# Patient Record
Sex: FEMALE | Race: WHITE | NOT HISPANIC OR LATINO | Employment: UNEMPLOYED | URBAN - NONMETROPOLITAN AREA
[De-identification: names, ages, dates, MRNs, and addresses within clinical notes are randomized per-mention and may not be internally consistent; named-entity substitution may affect disease eponyms.]

---

## 2021-02-12 ENCOUNTER — HOSPITAL ENCOUNTER (INPATIENT)
Facility: HOSPITAL | Age: 46
LOS: 7 days | Discharge: HOME | End: 2021-02-19
Attending: PSYCHIATRY & NEUROLOGY | Admitting: PSYCHIATRY & NEUROLOGY

## 2021-02-12 DIAGNOSIS — B18.2 CHRONIC HEPATITIS C WITHOUT HEPATIC COMA (HCC): Primary | ICD-10-CM

## 2021-02-12 PROBLEM — R45.851 SUICIDAL IDEATION: Status: ACTIVE | Noted: 2021-02-12

## 2021-02-12 RX ORDER — CLONIDINE HYDROCHLORIDE 0.1 MG/1
0.1 TABLET ORAL EVERY 4 HOURS PRN
Status: DISCONTINUED | OUTPATIENT
Start: 2021-02-12 | End: 2021-02-13 | Stop reason: SDUPTHER

## 2021-02-12 RX ORDER — ACETAMINOPHEN 325 MG/1
650 TABLET ORAL EVERY 4 HOURS PRN
Status: DISCONTINUED | OUTPATIENT
Start: 2021-02-12 | End: 2021-02-19 | Stop reason: HOSPADM

## 2021-02-12 RX ORDER — ALUMINA, MAGNESIA, AND SIMETHICONE 2400; 2400; 240 MG/30ML; MG/30ML; MG/30ML
15 SUSPENSION ORAL EVERY 6 HOURS PRN
Status: DISCONTINUED | OUTPATIENT
Start: 2021-02-12 | End: 2021-02-19 | Stop reason: HOSPADM

## 2021-02-12 RX ORDER — VENLAFAXINE HYDROCHLORIDE 150 MG/1
150 CAPSULE, EXTENDED RELEASE ORAL DAILY
Status: ON HOLD | COMMUNITY
Start: 2021-01-04 | End: 2021-02-19 | Stop reason: SDUPTHER

## 2021-02-12 RX ORDER — LOPERAMIDE HYDROCHLORIDE 2 MG/1
2 CAPSULE ORAL
Status: DISCONTINUED | OUTPATIENT
Start: 2021-02-12 | End: 2021-02-19 | Stop reason: HOSPADM

## 2021-02-12 RX ORDER — NICOTINE 21 MG/24HR
1 PATCH, TRANSDERMAL 24 HOURS TRANSDERMAL EVERY 24 HOURS
Status: DISCONTINUED | OUTPATIENT
Start: 2021-02-13 | End: 2021-02-19 | Stop reason: HOSPADM

## 2021-02-12 RX ORDER — OLANZAPINE 10 MG/1
10 TABLET ORAL 2 TIMES DAILY
Status: ON HOLD | COMMUNITY
End: 2021-02-19 | Stop reason: SDUPTHER

## 2021-02-12 RX ORDER — HYDROXYZINE PAMOATE 50 MG/1
50 CAPSULE ORAL EVERY 6 HOURS PRN
Status: DISCONTINUED | OUTPATIENT
Start: 2021-02-12 | End: 2021-02-19 | Stop reason: HOSPADM

## 2021-02-12 RX ORDER — ONDANSETRON 4 MG/1
4 TABLET, FILM COATED ORAL EVERY 6 HOURS PRN
Status: DISCONTINUED | OUTPATIENT
Start: 2021-02-12 | End: 2021-02-19 | Stop reason: HOSPADM

## 2021-02-12 RX ORDER — TRAZODONE HYDROCHLORIDE 50 MG/1
50 TABLET ORAL NIGHTLY PRN
Status: DISCONTINUED | OUTPATIENT
Start: 2021-02-12 | End: 2021-02-19 | Stop reason: HOSPADM

## 2021-02-12 RX ADMIN — TRAZODONE HYDROCHLORIDE 50 MG: 50 TABLET ORAL at 21:41

## 2021-02-12 RX ADMIN — HYDROXYZINE PAMOATE 50 MG: 50 CAPSULE ORAL at 21:41

## 2021-02-12 RX ADMIN — ACETAMINOPHEN 650 MG: 325 TABLET, FILM COATED ORAL at 21:41

## 2021-02-13 PROBLEM — F11.90 OPIOID USE DISORDER: Status: ACTIVE | Noted: 2021-02-13

## 2021-02-13 PROBLEM — F19.94 SUBSTANCE INDUCED MOOD DISORDER (HCC): Status: ACTIVE | Noted: 2021-02-13

## 2021-02-13 PROBLEM — F31.60 BIPOLAR AFFECTIVE DISORDER, MIXED (HCC): Status: ACTIVE | Noted: 2021-02-13

## 2021-02-13 PROBLEM — F15.10 METHAMPHETAMINE USE (HCC): Status: ACTIVE | Noted: 2021-02-13

## 2021-02-13 PROBLEM — F15.90 STIMULANT USE DISORDER: Status: ACTIVE | Noted: 2021-02-13

## 2021-02-13 PROBLEM — F13.20 MODERATE BENZODIAZEPINE USE DISORDER (HCC): Status: ACTIVE | Noted: 2021-02-13

## 2021-02-13 PROBLEM — F19.959 SUBSTANCE-INDUCED PSYCHOTIC DISORDER (HCC): Status: ACTIVE | Noted: 2021-02-13

## 2021-02-13 LAB
CHOLEST SERPL-MCNC: 223 MG/DL (ref 0–200)
GLUCOSE P FAST SERPL-MCNC: 104 MG/DL (ref 74–106)
HDLC SERPL-MCNC: 77 MG/DL (ref 40–60)
LDLC SERPL CALC-MCNC: 134 MG/DL (ref 0–100)
LDLC/HDLC SERPL: 1.71 {RATIO}
TRIGL SERPL-MCNC: 70 MG/DL (ref 0–150)
VLDLC SERPL-MCNC: 12 MG/DL (ref 5–40)

## 2021-02-13 PROCEDURE — 80061 LIPID PANEL: CPT | Performed by: PSYCHIATRY & NEUROLOGY

## 2021-02-13 PROCEDURE — 82947 ASSAY GLUCOSE BLOOD QUANT: CPT | Performed by: PSYCHIATRY & NEUROLOGY

## 2021-02-13 PROCEDURE — 99223 1ST HOSP IP/OBS HIGH 75: CPT | Performed by: PSYCHIATRY & NEUROLOGY

## 2021-02-13 RX ORDER — LORAZEPAM 2 MG/1
2 TABLET ORAL 2 TIMES DAILY
Status: COMPLETED | OUTPATIENT
Start: 2021-02-16 | End: 2021-02-16

## 2021-02-13 RX ORDER — OLANZAPINE 5 MG/1
10 TABLET, ORALLY DISINTEGRATING ORAL EVERY 8 HOURS PRN
Status: DISCONTINUED | OUTPATIENT
Start: 2021-02-13 | End: 2021-02-19 | Stop reason: HOSPADM

## 2021-02-13 RX ORDER — DICYCLOMINE HYDROCHLORIDE 10 MG/1
10 CAPSULE ORAL 3 TIMES DAILY PRN
Status: DISCONTINUED | OUTPATIENT
Start: 2021-02-13 | End: 2021-02-18

## 2021-02-13 RX ORDER — CYCLOBENZAPRINE HCL 10 MG
10 TABLET ORAL 3 TIMES DAILY PRN
Status: DISCONTINUED | OUTPATIENT
Start: 2021-02-13 | End: 2021-02-18

## 2021-02-13 RX ORDER — CLONIDINE HYDROCHLORIDE 0.1 MG/1
0.1 TABLET ORAL 2 TIMES DAILY PRN
Status: ACTIVE | OUTPATIENT
Start: 2021-02-16 | End: 2021-02-17

## 2021-02-13 RX ORDER — OLANZAPINE 5 MG/1
5 TABLET ORAL 2 TIMES DAILY
Status: DISCONTINUED | OUTPATIENT
Start: 2021-02-13 | End: 2021-02-14

## 2021-02-13 RX ORDER — LORAZEPAM 2 MG/1
2 TABLET ORAL 4 TIMES DAILY
Status: COMPLETED | OUTPATIENT
Start: 2021-02-13 | End: 2021-02-14

## 2021-02-13 RX ORDER — LORAZEPAM 2 MG/1
2 TABLET ORAL 3 TIMES DAILY
Status: COMPLETED | OUTPATIENT
Start: 2021-02-15 | End: 2021-02-15

## 2021-02-13 RX ORDER — VENLAFAXINE HYDROCHLORIDE 37.5 MG/1
37.5 CAPSULE, EXTENDED RELEASE ORAL
Status: COMPLETED | OUTPATIENT
Start: 2021-02-13 | End: 2021-02-14

## 2021-02-13 RX ORDER — NALTREXONE HYDROCHLORIDE 50 MG/1
25 TABLET, FILM COATED ORAL DAILY
Status: DISCONTINUED | OUTPATIENT
Start: 2021-02-13 | End: 2021-02-13

## 2021-02-13 RX ORDER — CLONIDINE HYDROCHLORIDE 0.1 MG/1
0.1 TABLET ORAL 4 TIMES DAILY PRN
Status: ACTIVE | OUTPATIENT
Start: 2021-02-13 | End: 2021-02-14

## 2021-02-13 RX ORDER — VENLAFAXINE HYDROCHLORIDE 75 MG/1
75 CAPSULE, EXTENDED RELEASE ORAL
Status: DISCONTINUED | OUTPATIENT
Start: 2021-02-15 | End: 2021-02-15

## 2021-02-13 RX ORDER — LORAZEPAM 2 MG/1
2 TABLET ORAL DAILY
Status: COMPLETED | OUTPATIENT
Start: 2021-02-17 | End: 2021-02-17

## 2021-02-13 RX ORDER — OLANZAPINE 5 MG/1
10 TABLET, ORALLY DISINTEGRATING ORAL ONCE
Status: COMPLETED | OUTPATIENT
Start: 2021-02-13 | End: 2021-02-13

## 2021-02-13 RX ORDER — PROPRANOLOL HYDROCHLORIDE 40 MG/1
40 TABLET ORAL ONCE
Status: DISCONTINUED | OUTPATIENT
Start: 2021-02-13 | End: 2021-02-13

## 2021-02-13 RX ORDER — CLONIDINE HYDROCHLORIDE 0.1 MG/1
0.1 TABLET ORAL 3 TIMES DAILY PRN
Status: ACTIVE | OUTPATIENT
Start: 2021-02-15 | End: 2021-02-16

## 2021-02-13 RX ORDER — CLONIDINE HYDROCHLORIDE 0.1 MG/1
0.1 TABLET ORAL ONCE AS NEEDED
Status: ACTIVE | OUTPATIENT
Start: 2021-02-17 | End: 2021-02-18

## 2021-02-13 RX ORDER — CLONIDINE HYDROCHLORIDE 0.1 MG/1
0.1 TABLET ORAL 4 TIMES DAILY PRN
Status: ACTIVE | OUTPATIENT
Start: 2021-02-14 | End: 2021-02-15

## 2021-02-13 RX ADMIN — Medication 1 PATCH: at 08:34

## 2021-02-13 RX ADMIN — OLANZAPINE 5 MG: 5 TABLET, FILM COATED ORAL at 20:26

## 2021-02-13 RX ADMIN — OLANZAPINE 10 MG: 5 TABLET, ORALLY DISINTEGRATING ORAL at 15:23

## 2021-02-13 RX ADMIN — VENLAFAXINE HYDROCHLORIDE 37.5 MG: 37.5 CAPSULE, EXTENDED RELEASE ORAL at 15:22

## 2021-02-13 RX ADMIN — LORAZEPAM 2 MG: 2 TABLET ORAL at 17:12

## 2021-02-13 RX ADMIN — LORAZEPAM 2 MG: 2 TABLET ORAL at 20:26
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
## 2021-02-13 NOTE — PLAN OF CARE
Goal Outcome Evaluation:        Outcome Summary: new admit. Patient took tylenol for generalized pain, vistaril for anxiety, and trazodone for sleep. Patient is resting now currently.

## 2021-02-13 NOTE — CONSULTS
"      [facility] Medicine Admission      Date of Admission: 2/12/2021      Primary Care Physician: Provider, No Known      Chief Complaint: No complaints    HPI: This is a 45-year-old female with past medical history of bipolar disorder that presented to [facility] on 2/12/2021 as a direct admit from [facility] secondary to suicidal ideation.  Patient reports no fever, chills, shortness of air, cough or headache.  The patient does not take any daily medications for medical issues.    Concurrent Medical History:  has no past medical history on file.    Past Surgical History:  has no past surgical history on file.    Family History: Hypertension.     Social History:  reports that she has been smoking cigarettes. She has been smoking about 2.00 packs per day. She has never used smokeless tobacco.    Allergies:   Allergies   Allergen Reactions   • Amoxicillin Nausea And Vomiting     vomiting   • Lamotrigine Other (See Comments)     Feels like \"there's something in my vagina\"     • Morphine Other (See Comments)     Pt reports \"it gave me an enlarged liver.\"       Medications:   Prior to Admission medications    Medication Sig Start Date End Date Taking? Authorizing Provider   OLANZapine (zyPREXA) 10 MG tablet Take 10 mg by mouth 2 (Two) Times a Day.   Yes Gerardo Salazar MD   venlafaxine XR (EFFEXOR-XR) 150 MG 24 hr capsule Take 150 mg by mouth Daily. 1/4/21 1/5/22 Yes Gerardo Salazar MD       Review of Systems:  Review of Systems   Constitutional: Negative for activity change and fatigue.   HENT: Negative for ear pain and sore throat.    Eyes: Negative for pain and discharge.   Respiratory: Negative for cough and shortness of breath.    Cardiovascular: Negative for chest pain and palpitations.   Gastrointestinal: Negative for abdominal pain and nausea.   Endocrine: Negative for cold intolerance and heat intolerance.   Genitourinary: Negative for difficulty " urinating and dysuria.   Musculoskeletal: Negative for arthralgias and gait problem.   Skin: Negative for color change and rash.   Neurological: Negative for dizziness and weakness.   Psychiatric/Behavioral: Negative for agitation and confusion.      Otherwise complete ROS is negative except as mentioned above.    Physical Exam:   Temp:  [96.9 °F (36.1 °C)-97.9 °F (36.6 °C)] 97.8 °F (36.6 °C)  Heart Rate:  [75-84] 84  Resp:  [18] 18  BP: (100-133)/(58-78) 100/58  Physical Exam  Constitutional:       Appearance: She is well-developed.   HENT:      Head: Normocephalic and atraumatic.   Eyes:      Pupils: Pupils are equal, round, and reactive to light.   Neck:      Musculoskeletal: Normal range of motion and neck supple.   Cardiovascular:      Rate and Rhythm: Normal rate and regular rhythm.   Pulmonary:      Effort: Pulmonary effort is normal.      Breath sounds: Normal breath sounds.   Abdominal:      General: Bowel sounds are normal.      Palpations: Abdomen is soft.   Musculoskeletal: Normal range of motion.   Skin:     General: Skin is warm and dry.   Neurological:      Mental Status: She is alert and oriented to person, place, and time.   Psychiatric:         Behavior: Behavior normal.       CN I: Sense of smell intact  CN II: Visual fields intact  CN III,IV,VI: extraocular movements intact  CN V: Masseter strength and sensation in all three divisions intact  CN VII: Smile and eyelid closure symmetrical  CN VIII: Hearing intact  CN IX and X: Voice and palate movement intact  CN XI: Shoulder shrug intact  CN XII: Tongue protrusion and movement intact      Results Reviewed:  I have personally reviewed current lab, radiology, and data and agree with results.  Lab Results (last 24 hours)     Procedure Component Value Units Date/Time    Glucose, Fasting [[id number]]  (Normal) Collected: 02/13/21 0604    Specimen: Blood Updated: 02/13/21 0735     Glucose, Fasting 104 mg/dL     Lipid Panel [[id number]]  (Abnormal)  Collected: 02/13/21 0604    Specimen: Blood Updated: 02/13/21 0735     Total Cholesterol 223 mg/dL      Triglycerides 70 mg/dL      HDL Cholesterol 77 mg/dL      LDL Cholesterol  134 mg/dL      VLDL Cholesterol 12 mg/dL      LDL/HDL Ratio 1.71    Narrative:      Cholesterol Reference Ranges  (U.S. Department of Health and Human Services ATP III Classifications)    Desirable          <200 mg/dL  Borderline High    200-239 mg/dL  High Risk          >240 mg/dL      Triglyceride Reference Ranges  (U.S. Department of Health and Human Services ATP III Classifications)    Normal           <150 mg/dL  Borderline High  150-199 mg/dL  High             200-499 mg/dL  Very High        >500 mg/dL    HDL Reference Ranges  (U.S. Department of Health and Human Services ATP III Classifcations)    Low     <40 mg/dl (major risk factor for CHD)  High    >60 mg/dl ('negative' risk factor for CHD)        LDL Reference Ranges  (U.S. Department of Health and Human Services ATP III Classifcations)    Optimal          <100 mg/dL  Near Optimal     100-129 mg/dL  Borderline High  130-159 mg/dL  High             160-189 mg/dL  Very High        >189 mg/dL        Imaging Results (Last 24 Hours)     ** No results found for the last 24 hours. **            Assessment:        Active Hospital Problems    Diagnosis POA   • **Suicidal ideation [R45.851] Not Applicable   • Bipolar affective disorder, mixed (CMS/HCC) [F31.60] Unknown   • Opioid use disorder (CMS/HCC) [F11.99] Unknown   • Stimulant use disorder [F15.90] Unknown   • Methamphetamine use (CMS/HCC) [F15.10] Unknown   • Moderate benzodiazepine use disorder (CMS/HCC) [F13.20] Unknown   • Rule In/Out Substance induced mood disorder [F19.94] Unknown   • Rule In / Out Substance-induced psychotic disorder [F19.959] Unknown       Plan:  Suicidal ideation/bipolar affective disorder: Continue therapy with psychiatry.    We will sign off here.  Please contact the hospitalist services for any further  issues.    I discussed the patient's findings and my recommendations with: The patient      This document has been electronically signed by ALETHA Tillman on February 13, 2021 16:53 CST
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
## 2021-02-14 LAB
25(OH)D3 SERPL-MCNC: 26.2 NG/ML (ref 30–100)
FOLATE SERPL-MCNC: 6.85 NG/ML (ref 4.78–24.2)
HAV IGM SERPL QL IA: ABNORMAL
HBV CORE IGM SERPL QL IA: ABNORMAL
HBV SURFACE AG SERPL QL IA: ABNORMAL
HCV AB SER DONR QL: REACTIVE
HIV1+2 AB SER QL: NORMAL
RPR SER QL: NORMAL
TSH SERPL DL<=0.05 MIU/L-ACNC: 1.45 UIU/ML (ref 0.27–4.2)
VIT B12 BLD-MCNC: 578 PG/ML (ref 211–946)
WHOLE BLOOD HOLD SPECIMEN: NORMAL

## 2021-02-14 PROCEDURE — G0432 EIA HIV-1/HIV-2 SCREEN: HCPCS | Performed by: PSYCHIATRY & NEUROLOGY

## 2021-02-14 PROCEDURE — 99232 SBSQ HOSP IP/OBS MODERATE 35: CPT | Performed by: PSYCHIATRY & NEUROLOGY

## 2021-02-14 PROCEDURE — 82746 ASSAY OF FOLIC ACID SERUM: CPT | Performed by: PSYCHIATRY & NEUROLOGY

## 2021-02-14 PROCEDURE — 82306 VITAMIN D 25 HYDROXY: CPT | Performed by: PSYCHIATRY & NEUROLOGY

## 2021-02-14 PROCEDURE — 80074 ACUTE HEPATITIS PANEL: CPT | Performed by: PSYCHIATRY & NEUROLOGY

## 2021-02-14 PROCEDURE — 86592 SYPHILIS TEST NON-TREP QUAL: CPT | Performed by: PSYCHIATRY & NEUROLOGY

## 2021-02-14 PROCEDURE — 82607 VITAMIN B-12: CPT | Performed by: PSYCHIATRY & NEUROLOGY

## 2021-02-14 PROCEDURE — 84443 ASSAY THYROID STIM HORMONE: CPT | Performed by: PSYCHIATRY & NEUROLOGY

## 2021-02-14 RX ADMIN — VENLAFAXINE HYDROCHLORIDE 37.5 MG: 37.5 CAPSULE, EXTENDED RELEASE ORAL at 08:34

## 2021-02-14 RX ADMIN — LORAZEPAM 2 MG: 2 TABLET ORAL at 12:53

## 2021-02-14 RX ADMIN — Medication 1 PATCH: at 08:36

## 2021-02-14 RX ADMIN — OLANZAPINE 5 MG: 5 TABLET, FILM COATED ORAL at 08:34

## 2021-02-14 RX ADMIN — LORAZEPAM 2 MG: 2 TABLET ORAL at 08:34

## 2021-02-14 RX ADMIN — LORAZEPAM 2 MG: 2 TABLET ORAL at 17:10

## 2021-02-14 RX ADMIN — LORAZEPAM 2 MG: 2 TABLET ORAL at 20:25

## 2021-02-14 RX ADMIN — OLANZAPINE 7.5 MG: 2.5 TABLET, FILM COATED ORAL at 20:25

## 2021-02-14 NOTE — NURSING NOTE
Behavior   Note any precipitants to event or behavior   Describe level and action of any aggressive behavior or speech and associated interventions.     Anxiety: Excess Worry  Depression: depressed mood  Pain  0  AVH   no  S/I   no  Plan  no  H/I   no  Plan  no    Affect   flat      Note: Patient in room sleeping on assessment. She is alert and oriented. Her affect is flat and she participates minimally in assessment. She is cooperative and takes medications as ordered. Encouraged open communication with staff. Patient is agreeable to make needs known. No acute S/S of distress.       Intervention    PRN medication utilized:  no    Instructed in medication usage and effects  Medications administered as ordered  Encouraged to verbalize needs      Response    Verbalized understanding   Did patient take medications as ordered yes   Did patient interact with assessment?  Minimally     Plan    Will monitor for safety  Will monitor every 15 minutes as ordered  Will evaluate and promote the plan of care    Last BM:  unknown date  (Please chart in I/O as well)

## 2021-02-14 NOTE — PROGRESS NOTES
Psychiatry Progress Note   2/14/2021      HD: #2  Legal: Vol    Chief Complaint: Suicidal Ideation      Subjective --  Ms. Kimberly Henderson is a 45 y.o. female who was seen on the Adult unit.      Patient is seen in her room today.  She is a bit more organized.  Still suboptimally engaged.  She is very focused on medication.  Also we discussed what her plan would be after her stay here and she states she does not want to do further rehab.  I attempted to explore this rationale with the patient though she became more guarded and irritable.  She states that she will likely return home.    She continues to report suicidal ideation though does not as severe as yesterday.  She reports at times feeling restless though she is sitting appropriately and has not hyperactive or restless as before.  She is later seen out in the day room interacting more with peers and staff.        Review of Systems:  --Neuro: Denies headache  --GI: Denies stomachache  --MS: Denies muscle ache  --General: Denies dizziness.      Objective   Objective --    Vital Signs:  Temp:  [97.7 °F (36.5 °C)-98.7 °F (37.1 °C)] 97.7 °F (36.5 °C)  Heart Rate:  [84-89] 84  Resp:  [17] 17  BP: (119-131)/(70-80) 131/80    Patient Vitals for the past 24 hrs:   BP Temp Temp src Pulse Resp SpO2   02/14/21 0700 131/80 97.7 °F (36.5 °C) Tympanic 84 17 93 %   02/13/21 1900 119/70 98.7 °F (37.1 °C) Tympanic 89 17 95 %          Physical Exam:   -General Appearance:  alert and in NAD  -Hygiene:  Adequate   -Gait & Station:  Blank multiple: Normal  -Musculoskeletal:  No tremors or abnormal involuntary movements and No Cog [place] or Rigidity and No atrophy noted  -Pulm: unlaboured    Mental Status Exam:   --Cooperation:  more cooperative  --Eye Contact:  Fair  --Psychomotor Behavior:  Improving and More Appropriate today  --Mood:  Sad/Depressed  --Affect:  blunted and heavily constricted  --Speech:  improving today, not rambling  --Thought Process:  [place], Improving,  More Coherent, More Logical and More Organized  --Associations: Goal Directed, Circumstantial and Tangential  --Themes:  Worthlessness, Helplessness, Hopelessness and Failure  --Thought Content:     --Mood congruent   --Suicidal:  Suicidal Ideation    --Homicidal:  Denies   --Hallucinations:  Denies and Not appearing to respond to internal stimuli at time of my interview   --Delusion:  Paranoid - improving  --Cognitive Functioning:  --Consciousness: awake and alert  Attention:  Distractible  --Reliability:  adequate  --Insight:  Limited  --Judgment:  Impaired  --Impulse Control:  Impaired      Lab Results (last 24 hours)     Procedure Component Value Units Date/Time    Hepatitis Panel, Acute [[id number]]  (Abnormal) Collected: 02/14/21 0635    Specimen: Blood Updated: 02/14/21 0814     Hepatitis B Surface Ag Non-Reactive     Hep A IgM Non-Reactive     Hep B C IgM Non-Reactive     Hepatitis C Ab Reactive    Narrative:      Results may be falsely decreased if patient taking Biotin.     HIV-1 & HIV-2 Antibodies [[id number]]  (Normal) Collected: 02/14/21 0635    Specimen: Blood Updated: 02/14/21 0748    Narrative:      The following orders were created for panel order HIV-1 & HIV-2 Antibodies.  Procedure                               Abnormality         Status                     ---------                               -----------         ------                     HIV-1 / O / 2 Ag / Antib...[[id number]]  Normal              Final result                 Please view results for these tests on the individual orders.    HIV-1 / O / 2 Ag / Antibody 4th Generation [[id number]]  (Normal) Collected: 02/14/21 0635    Specimen: Blood Updated: 02/14/21 0748     HIV-1/ HIV-2 Non-Reactive    Narrative:      The HIV antibody/antigen combo assay is a qualitative assay for HIV that includes the p24 antigen as well as antibodies to HIV types 1 and 2. This test is intended to be used as a screening assay in the diagnosis of HIV infection in  patients over the age of 2.  Results may be falsely decreased if patient taking Biotin.      Extra Tubes [[id number]] Collected: 21    Specimen: Blood, Venous Line Updated: 21    Narrative:      The following orders were created for panel order Extra Tubes.  Procedure                               Abnormality         Status                     ---------                               -----------         ------                     Lavender Top[[id number]]                                     Final result                 Please view results for these tests on the individual orders.    Lavender Top [[id number]] Collected: 21    Specimen: Blood Updated: 21     Extra Tube hold for add-on     Comment: Auto resulted       TSH [[id number]]  (Normal) Collected: 21    Specimen: Blood Updated: 21     TSH 1.450 uIU/mL     Vitamin B12 [[id number]] Collected: 21    Specimen: Blood Updated: 21    Folate [[id number]] Collected: 21    Specimen: Blood Updated: 21    Vitamin D 25 Hydroxy [[id number]] Collected: 21    Specimen: Blood Updated: 21    RPR [[id number]] Collected: 21    Specimen: Blood Updated: 21        Results source: EMR    Imaging Results (Last 24 Hours)     ** No results found for the last 24 hours. **        Results source: EMR    Medications:   Scheduled Meds:LORazepam, 2 mg, Oral, 4x Daily    Followed by  [START ON 2/15/2021] LORazepam, 2 mg, Oral, TID    Followed by  [START ON 2021] LORazepam, 2 mg, Oral, BID    Followed by  [START ON 2021] LORazepam, 2 mg, Oral, Daily  nicotine, 1 patch, Transdermal, Q24H  OLANZapine, 5 mg, Oral, BID  [START ON 2/15/2021] venlafaxine XR, 75 mg, Oral, Daily With Breakfast      Continuous Infusions:   PRN Meds:.•  acetaminophen  •  aluminum-magnesium hydroxide-simethicone  •  [] cloNIDine **FOLLOWED BY** cloNIDine **FOLLOWED  BY** [START ON 2/15/2021] cloNIDine **FOLLOWED BY** [START ON 2/16/2021] cloNIDine **FOLLOWED BY** [START ON 2/17/2021] cloNIDine  •  cyclobenzaprine  •  dicyclomine  •  hydrOXYzine pamoate  •  loperamide  •  magnesium hydroxide  •  OLANZapine zydis  •  ondansetron  •  traZODone      Assessment:     Bipolar affective disorder, mixed (CMS/HCC)    Suicidal ideation    Opioid use disorder (CMS/HCC)    Stimulant use disorder    Methamphetamine use (CMS/HCC)    Moderate benzodiazepine use disorder (CMS/HCC)    Rule In/Out Substance induced mood disorder    Rule In / Out Substance-induced psychotic disorder        --> IMPRESSION: Slowly improving cognitive organization, lessening psychosis and paranoia.      Treatment Plan:  1) Will continue care for the patient on the behavioral health unit at [facility].    2) Will continue to provide treatment with the unit milieu, activities, therapies and psychopharmacological management.    3) Patient to be placed on or continued on  Q15 minute checks  and Suicide precautions.    4) Treatment Planning:    Bipolar disorder: slowly improving  --Titrate Zyprexa to 7.5mg BID  --Cont Zydis PRN  --Titrate Effexor to 75mg daily    Substance use d/o  --Cont Ativan taper over next four days  --Cont nicotine patch  --Cont COWS with Catpres use  --Cont to encourage sub use rehab    --> Psychotherapy provided: <15 min    --> Dispostion Planning: to community vs rehab in next 5 - 8 days.     Treatment plan and medication risks and benefits discussed with: Patient and Treatment Team.    Shivam Cummings II, MD  02/14/21 @ 11:32 CST    Dictated using Dragon.

## 2021-02-14 NOTE — PLAN OF CARE
Goal Outcome Evaluation:  Plan of Care Reviewed With: patient  Progress: no change  Outcome Summary: Patient has slept 7.5 hours thus far during shift and is currently still sleeping. Patient denies SI, HI and AVH.

## 2021-02-14 NOTE — NURSING NOTE
Behavior   Note any precipitants to event or behavior   Describe level and action of any aggressive behavior or speech and associated interventions.     Anxiety: Excess Worry and Restless/Edgy  Depression: depressed mood  Pain  0  AVH   no  S/I   no  Plan  no  H/I   no  Plan  no    Affect   flat      Note: Patient sleeping when signee entered room. COWS total of 1 at this time. Will continue to monitor and provide a safe environment.      Intervention    PRN medication utilized:  no    Instructed in medication usage and effects  Medications administered as ordered  Encouraged to verbalize needs      Response    Verbalized understanding   Did patient take medications as ordered yes   Did patient interact with assessment?  yes     Plan    Will monitor for safety  Will monitor every 15 minutes as ordered  Will evaluate and promote the plan of care    Last BM:    (Please chart in I/O as well)

## 2021-02-15 PROCEDURE — 99232 SBSQ HOSP IP/OBS MODERATE 35: CPT | Performed by: PSYCHIATRY & NEUROLOGY

## 2021-02-15 RX ORDER — OLANZAPINE 10 MG/1
10 TABLET ORAL 2 TIMES DAILY
Status: DISCONTINUED | OUTPATIENT
Start: 2021-02-15 | End: 2021-02-19 | Stop reason: HOSPADM

## 2021-02-15 RX ADMIN — OLANZAPINE 10 MG: 10 TABLET, FILM COATED ORAL at 20:26

## 2021-02-15 RX ADMIN — VENLAFAXINE HYDROCHLORIDE 75 MG: 75 CAPSULE, EXTENDED RELEASE ORAL at 08:06

## 2021-02-15 RX ADMIN — LORAZEPAM 2 MG: 2 TABLET ORAL at 08:07

## 2021-02-15 RX ADMIN — LORAZEPAM 2 MG: 2 TABLET ORAL at 15:24

## 2021-02-15 RX ADMIN — TRAZODONE HYDROCHLORIDE 50 MG: 50 TABLET ORAL at 01:19

## 2021-02-15 RX ADMIN — OLANZAPINE 7.5 MG: 2.5 TABLET, FILM COATED ORAL at 08:07

## 2021-02-15 RX ADMIN — Medication 1 PATCH: at 08:07

## 2021-02-15 RX ADMIN — LORAZEPAM 2 MG: 2 TABLET ORAL at 20:26

## 2021-02-15 NOTE — NURSING NOTE
Behavior   Note any precipitants to event or behavior   Describe level and action of any aggressive behavior or speech and associated interventions.     Anxiety: Patient denies at this time  Depression: Patient denies at this time  Pain  0  AVH   no  S/I   no  Plan  no  H/I   no  Plan  no    Affect   flat      Note:Pt is alert, oriented x3 verbal and ambulatory. Appropriate interaction with staff and peers, Took medication as instructed and ordered by the doctor. Cooperative and compliant. Denies any needs at this time. Will continue to monitor and provide a safe environment.       Intervention    PRN medication utilized:  no    Instructed in medication usage and effects  Medications administered as ordered  Encouraged to verbalize needs      Response    Verbalized understanding   Did patient take medications as ordered yes   Did patient interact with assessment?  yes     Plan    Will monitor for safety  Will monitor every 15 minutes as ordered  Will evaluate and promote the plan of care    Last BM:  unknown date  (Please chart in I/O as well)

## 2021-02-15 NOTE — PLAN OF CARE
Goal Outcome Evaluation:  Plan of Care Reviewed With: patient  Progress: no change  Outcome Summary: Pt has been compliant and on task. Attending groups as scheduled.

## 2021-02-15 NOTE — PLAN OF CARE
"  Problem: Adult Behavioral Health Plan of Care  Goal: Optimized Coping Skills in Response to Life Stressors  Outcome: Ongoing, Progressing   Goal Outcome Evaluation:         Met with patient and completed Recreation Therapy Assessment.  Patient has blunted affect and reports SI and anxiety.  She describes herself as \"nobody\".  She is unable to identify any coping skills.  Patient reports she does not engage in any recreational activity and spends time sitting around, pacing and looking out the window.  She states that she uses a bicycle for transportation.  She states that her past leisure activities includes family gatherings and cookouts.  Will encourage group participation and identification of healthy outlets and coping skills.  "

## 2021-02-15 NOTE — PLAN OF CARE
Goal Outcome Evaluation:  Plan of Care Reviewed With: patient  Progress: no change  Outcome Summary: Patient has slept approximately 5.5 hours thus far during shift and is currently still sleeping, Patient did request and receive Trazodone at 0119 for sleep.

## 2021-02-15 NOTE — PROGRESS NOTES
"2/15/2021    Chief Complaint: suicidal ideation and substance abuse    Subjective:  Patient is a 45 y.o. female that is currently inpatient on the adult U Today she is seen in her room and in treatment team. Pt comes off aggressive and irritable. She states she has been having \"stupid ass dreams\" that is keeping her from good sleep.  Pt reports that she does not like the program at [facility] because they messed her medications up and it hasn't been good for her.   Pt states that she continues to have thoughts of suicide with a plan, but does not give details. She states that she feels hopeless at this point.    Pt is compliant with medications, she is more engaged in conversation as it goes on.  No HI/AVH reported.     Objective     Vital Signs    Temp:  [97.8 °F (36.6 °C)-98.2 °F (36.8 °C)] 97.8 °F (36.6 °C)  Heart Rate:  [] 112  Resp:  [16-20] 20  BP: (128-143)/(77-96) 143/96    Physical Exam:   General Appearance: alert, appears stated age and cooperative,  Hygiene:   fair  Gait & Station: Normal  Musculoskeletal: No tremors or abnormal involuntary movements    Mental Status Exam:   Cooperation:  Cooperative  Eye Contact:  Fair  Psychomotor Behavior:  Aggitated  Mood: Irritable  Affect:  normal  Speech:  Normal  Thought Process:  Coherent  Associations: Circumstantial  Thought Content:     Mood congruent   Suicidal:  Suicidal Ideation   Homicidal:  None   Hallucinations:  None   Delusion:  None  Cognitive Functioning:   Consciousness: awake, alert and oriented  Reliability:  fair  Insight:  lacking  Judgement:  Impaired  Impulse Control:  Impaired    Lab Results (last 24 hours)     Procedure Component Value Units Date/Time    RPR [[id number]]  (Normal) Collected: 02/14/21 0635    Specimen: Blood Updated: 02/14/21 1830     RPR Non-Reactive    Vitamin B12 [[id number]]  (Normal) Collected: 02/14/21 0635    Specimen: Blood Updated: 02/14/21 1224     Vitamin B-12 578 pg/mL     Narrative:      Results may be " falsely increased if patient taking Biotin.      Folate [[id number]]  (Normal) Collected: 21    Specimen: Blood Updated: 21 1224     Folate 6.85 ng/mL     Narrative:      Results may be falsely increased if patient taking Biotin.      Vitamin D 25 Hydroxy [[id number]]  (Abnormal) Collected: 2135    Specimen: Blood Updated: 21 1224     25 Hydroxy, Vitamin D 26.2 ng/ml     Narrative:      Reference Range for Total Vitamin D 25(OH)     Deficiency <20.0 ng/mL   Insufficiency 21-29 ng/mL   Sufficiency  ng/mL  Toxicity >100 ng/ml    Results may be falsely increased if patient taking Biotin.          Imaging Results (Last 24 Hours)     ** No results found for the last 24 hours. **          Medicine:   Current Facility-Administered Medications:   •  acetaminophen (TYLENOL) tablet 650 mg, 650 mg, Oral, Q4H PRN, Shivam Cummings II, MD, 650 mg at 211  •  aluminum-magnesium hydroxide-simethicone (MAALOX MAX) 400-400-40 MG/5ML suspension 15 mL, 15 mL, Oral, Q6H PRN, Shivam Cummings II, MD  •  [] cloNIDine (CATAPRES) tablet 0.1 mg, 0.1 mg, Oral, 4x Daily PRN **FOLLOWED BY** [] cloNIDine (CATAPRES) tablet 0.1 mg, 0.1 mg, Oral, 4x Daily PRN **FOLLOWED BY** cloNIDine (CATAPRES) tablet 0.1 mg, 0.1 mg, Oral, TID PRN **FOLLOWED BY** [START ON 2021] cloNIDine (CATAPRES) tablet 0.1 mg, 0.1 mg, Oral, BID PRN **FOLLOWED BY** [START ON 2021] cloNIDine (CATAPRES) tablet 0.1 mg, 0.1 mg, Oral, Once PRN, Shivam Cummings II, MD  •  cyclobenzaprine (FLEXERIL) tablet 10 mg, 10 mg, Oral, TID PRN, Shivam Cummings II, MD  •  dicyclomine (BENTYL) capsule 10 mg, 10 mg, Oral, TID PRN, Shivam Cummings II, MD  •  hydrOXYzine pamoate (VISTARIL) capsule 50 mg, 50 mg, Oral, Q6H PRN, Shivam Cummings II, MD, 50 mg at 21  •  loperamide (IMODIUM) capsule 2 mg, 2 mg, Oral, Q2H PRN, Shivam Cummings II, MD  •  [COMPLETED] LORazepam (ATIVAN)  tablet 2 mg, 2 mg, Oral, 4x Daily, 2 mg at 02/14/21 2025 **FOLLOWED BY** LORazepam (ATIVAN) tablet 2 mg, 2 mg, Oral, TID, 2 mg at 02/15/21 0807 **FOLLOWED BY** [START ON 2/16/2021] LORazepam (ATIVAN) tablet 2 mg, 2 mg, Oral, BID **FOLLOWED BY** [START ON 2/17/2021] LORazepam (ATIVAN) tablet 2 mg, 2 mg, Oral, Daily, Shivam Cummings II, MD  •  magnesium hydroxide (MILK OF MAGNESIA) suspension 2400 mg/10mL 10 mL, 10 mL, Oral, Daily PRN, Shivam Cummings II, MD  •  nicotine (NICODERM CQ) 21 MG/24HR patch 1 patch, 1 patch, Transdermal, Q24H, Shivam Cummings II, MD, 1 patch at 02/15/21 0807  •  OLANZapine (zyPREXA) tablet 7.5 mg, 7.5 mg, Oral, BID, Shivam Cummings II, MD, 7.5 mg at 02/15/21 0807  •  OLANZapine zydis (zyPREXA) disintegrating tablet 10 mg, 10 mg, Oral, Q8H PRN, Shivam Cummings II, MD  •  ondansetron (ZOFRAN) tablet 4 mg, 4 mg, Oral, Q6H PRN, Shivam Cummings II, MD  •  traZODone (DESYREL) tablet 50 mg, 50 mg, Oral, Nightly PRN, Shivam Cummings II, MD, 50 mg at 02/15/21 0119  •  [COMPLETED] venlafaxine XR (EFFEXOR-XR) 24 hr capsule 37.5 mg, 37.5 mg, Oral, Daily With Breakfast, 37.5 mg at 02/14/21 0834 **FOLLOWED BY** venlafaxine XR (EFFEXOR-XR) 24 hr capsule 75 mg, 75 mg, Oral, Daily With Breakfast, Shivam Cummings II, MD, 75 mg at 02/15/21 0806    Diagnoses/Assessment:     Bipolar affective disorder, mixed (CMS/HCC)    Suicidal ideation    Opioid use disorder (CMS/HCC)    Stimulant use disorder    Methamphetamine use (CMS/HCC)    Moderate benzodiazepine use disorder (CMS/HCC)    Rule In/Out Substance induced mood disorder    Rule In / Out Substance-induced psychotic disorder      Treatment Plan:    1) Will continue care for the patient on the behavioral health unit at [facility] to ensure patient safety.  2) Will continue to provide treatment with the unit milieu, activities, therapies and psychopharmacological management.  3) Patient to be  placed on or continued on  Q15 minute checks  and Suicide precautions.  4) Pertinent medical issues:  none  5) Will order following labs: none  6) Will make the following medication changes:   Bipolar disorder:   --Titrate Zyprexa to 10 mg BID  --Cont Zydis PRN  --Cont Effexor to 75mg daily     Substance use d/o  --Cont Ativan taper over next four days  --Cont nicotine patch  --Cont COWS with Catpres use  --Cont to encourage sub use rehab  7) Will continue discharge planning as appropriate for patient.  8) Psychotherapy provided for less than 16 minutes.    Treatment plan and medication risks and benefits discussed with: Patient    Ying Elam, APRN  02/15/21  11:15 CST

## 2021-02-15 NOTE — NURSING NOTE
Behavior   Note any precipitants to event or behavior   Describe level and action of any aggressive behavior or speech and associated interventions.     Anxiety: Excess Worry and Decreased concentration  Depression: depressed mood and difficulty concentrating  Pain  0  AVH   no  S/I   no  Plan  no  H/I   no  Plan  no    Affect   flat      Note:Patient sleeping when signee entered room. Patient denies SI, HI and AVH at this time Patient is cooperative and compliant with medications. Will continue to monitor and provide a safe environment.      Intervention    PRN medication utilized:  no    Instructed in medication usage and effects  Medications administered as ordered  Encouraged to verbalize needs      Response    Verbalized understanding   Did patient take medications as ordered yes   Did patient interact with assessment?  yes     Plan    Will monitor for safety  Will monitor every 15 minutes as ordered  Will evaluate and promote the plan of care    Last BM:    (Please chart in I/O as well)

## 2021-02-16 PROCEDURE — 99232 SBSQ HOSP IP/OBS MODERATE 35: CPT | Performed by: PSYCHIATRY & NEUROLOGY

## 2021-02-16 RX ORDER — TERAZOSIN 1 MG/1
1 CAPSULE ORAL NIGHTLY
Status: DISCONTINUED | OUTPATIENT
Start: 2021-02-16 | End: 2021-02-19 | Stop reason: HOSPADM

## 2021-02-16 RX ORDER — MELATONIN
1000 DAILY
Status: DISCONTINUED | OUTPATIENT
Start: 2021-02-16 | End: 2021-02-19 | Stop reason: HOSPADM

## 2021-02-16 RX ORDER — NALTREXONE HYDROCHLORIDE 50 MG/1
50 TABLET, FILM COATED ORAL DAILY
Status: DISCONTINUED | OUTPATIENT
Start: 2021-02-17 | End: 2021-02-19 | Stop reason: HOSPADM

## 2021-02-16 RX ORDER — NALTREXONE HYDROCHLORIDE 50 MG/1
25 TABLET, FILM COATED ORAL DAILY
Status: COMPLETED | OUTPATIENT
Start: 2021-02-16 | End: 2021-02-16

## 2021-02-16 RX ORDER — VENLAFAXINE HYDROCHLORIDE 75 MG/1
150 CAPSULE, EXTENDED RELEASE ORAL
Status: DISCONTINUED | OUTPATIENT
Start: 2021-02-17 | End: 2021-02-19 | Stop reason: HOSPADM

## 2021-02-16 RX ADMIN — VENLAFAXINE HYDROCHLORIDE 112.5 MG: 37.5 CAPSULE, EXTENDED RELEASE ORAL at 08:27

## 2021-02-16 RX ADMIN — LORAZEPAM 2 MG: 2 TABLET ORAL at 08:28

## 2021-02-16 RX ADMIN — OLANZAPINE 10 MG: 10 TABLET, FILM COATED ORAL at 20:13

## 2021-02-16 RX ADMIN — LORAZEPAM 2 MG: 2 TABLET ORAL at 20:13

## 2021-02-16 RX ADMIN — Medication 1 PATCH: at 08:27

## 2021-02-16 RX ADMIN — OLANZAPINE 10 MG: 10 TABLET, FILM COATED ORAL at 08:27

## 2021-02-16 RX ADMIN — Medication 1000 UNITS: at 11:57

## 2021-02-16 RX ADMIN — TERAZOSIN HYDROCHLORIDE 1 MG: 1 CAPSULE ORAL at 20:13

## 2021-02-16 RX ADMIN — OLANZAPINE 10 MG: 5 TABLET, ORALLY DISINTEGRATING ORAL at 17:47

## 2021-02-16 RX ADMIN — NALTREXONE HYDROCHLORIDE 25 MG: 50 TABLET, FILM COATED ORAL at 15:10

## 2021-02-16 NOTE — PLAN OF CARE
Goal Outcome Evaluation:  Plan of Care Reviewed With: patient  Progress: improving  Outcome Summary: Pt reports anxiety and depression. Denies SI/HI/AVH.

## 2021-02-16 NOTE — NURSING NOTE
Behavior   Note any precipitants to event or behavior   Describe level and action of any aggressive behavior or speech and associated interventions.     Anxiety: Easily fatigued  Depression: depressed mood  Pain  0  AVH   no  S/I   no  Plan  no  H/I   no  Plan  no    Affect   flat      Note: Pt resting in bed, states she is restless and needs Ativan. Pt appears to be calm at this time, pt informed she can have Vistaril for itching and anxiety if she needs it. Pt states she would tell nursing if needed. Denies SI, HI, AVH at this time.       Intervention    PRN medication utilized:  no    Instructed in medication usage and effects  Medications administered as ordered  Encouraged to verbalize needs      Response    Verbalized understanding   Did patient take medications as ordered yes  Did patient interact with assessment?  no    Plan    Will monitor for safety  Will monitor every 15 minutes as ordered  Will evaluate and promote the plan of care    Last BM:  unknown date  (Please chart in I/O as well)

## 2021-02-16 NOTE — NURSING NOTE
Behavior   Note any precipitants to event or behavior   Describe level and action of any aggressive behavior or speech and associated interventions.     Anxiety: Restless/Edgy and Easily fatigued  Depression: Patient denies at this time  Pain  0  AVH   no  S/I   no  Plan  no  H/I   no  Plan  no    Affect   flat      Note: Pt in common area just finished with breakfast. Pt reports not sleeping well related to night pruitt. Took medication as ordered. Denies SI/HI/AVH/Depression or anxiety.      Intervention    PRN medication utilized:  no    Instructed in medication usage and effects  Medications administered as ordered  Encouraged to verbalize needs      Response    Verbalized understanding   Did patient take medications as ordered yes   Did patient interact with assessment?  yes     Plan    Will monitor for safety  Will monitor every 15 minutes as ordered  Will evaluate and promote the plan of care    Last BM:  unknown date  (Please chart in I/O as well)

## 2021-02-16 NOTE — PROGRESS NOTES
2/16/2021    Chief Complaint: suicidal ideation and substance abuse    Subjective:  Patient is a 45 y.o. female that is currently inpatient on the adult U Today she is seen in her bed. She is disheveled and minimally engaged.  Pt states that she has been  Having nightmares and that interrupts her sleep. Pt states that she is not having thoughts of suicide today and that she wants to be alive.    Pt is educated on the importance of hygiene and engaging with peers. She states that she will shower today. She is encouraged to attend groups and if she continues not to attend, there will be a order for her to stay out of her room. Pt verbalizes understanding and reasoning for active treatment coming from groups.   Pt is compliant with medications, she is agreeable to start Hytrin for nightmares.  She is eating well and denies AE with medications.   Objective     Vital Signs    Temp:  [97 °F (36.1 °C)-98 °F (36.7 °C)] 97 °F (36.1 °C)  Heart Rate:  [73-88] 83  Resp:  [16] 16  BP: (110-155)/(60-87) 155/84    Physical Exam:   General Appearance: alert, appears stated age and cooperative,  Hygiene:   poor  Gait & Station: Normal  Musculoskeletal: No tremors or abnormal involuntary movements    Mental Status Exam:   Cooperation:  Evasive  Eye Contact:  Poor  Psychomotor Behavior:  Slow  Mood: Apathetic  Affect:  normal  Speech:  Minimal  Thought Process:  Coherent  Associations: Circumstantial  Thought Content:     Mood congruent   Suicidal:  None   Homicidal:  None   Hallucinations:  None   Delusion:  None  Cognitive Functioning:   Consciousness: awake, alert and oriented  Reliability:  fair  Insight:  Poor  Judgement:  Poor  Impulse Control:  Fair    Lab Results (last 24 hours)     ** No results found for the last 24 hours. **        Imaging Results (Last 24 Hours)     ** No results found for the last 24 hours. **          Medicine:   Current Facility-Administered Medications:   •  acetaminophen (TYLENOL) tablet 650 mg, 650  mg, Oral, Q4H PRN, Shivam Cummings II, MD, 650 mg at 21  •  aluminum-magnesium hydroxide-simethicone (MAALOX MAX) 400-400-40 MG/5ML suspension 15 mL, 15 mL, Oral, Q6H PRN, Shivam Cummings II, MD  •  [] cloNIDine (CATAPRES) tablet 0.1 mg, 0.1 mg, Oral, 4x Daily PRN **FOLLOWED BY** [] cloNIDine (CATAPRES) tablet 0.1 mg, 0.1 mg, Oral, 4x Daily PRN **FOLLOWED BY** [] cloNIDine (CATAPRES) tablet 0.1 mg, 0.1 mg, Oral, TID PRN **FOLLOWED BY** cloNIDine (CATAPRES) tablet 0.1 mg, 0.1 mg, Oral, BID PRN **FOLLOWED BY** [START ON 2021] cloNIDine (CATAPRES) tablet 0.1 mg, 0.1 mg, Oral, Once PRN, Shivam Cummings II, MD  •  cyclobenzaprine (FLEXERIL) tablet 10 mg, 10 mg, Oral, TID PRN, Shivam Cummings II, MD  •  dicyclomine (BENTYL) capsule 10 mg, 10 mg, Oral, TID PRN, Shivam Cummings II, MD  •  hydrOXYzine pamoate (VISTARIL) capsule 50 mg, 50 mg, Oral, Q6H PRN, Shivam Cummings II, MD, 50 mg at 21  •  loperamide (IMODIUM) capsule 2 mg, 2 mg, Oral, Q2H PRN, Shivam Cummings II, MD  •  [COMPLETED] LORazepam (ATIVAN) tablet 2 mg, 2 mg, Oral, 4x Daily, 2 mg at 21 **FOLLOWED BY** [COMPLETED] LORazepam (ATIVAN) tablet 2 mg, 2 mg, Oral, TID, 2 mg at 02/15/21 2026 **FOLLOWED BY** LORazepam (ATIVAN) tablet 2 mg, 2 mg, Oral, BID, 2 mg at 02/16/21 0828 **FOLLOWED BY** [START ON 2021] LORazepam (ATIVAN) tablet 2 mg, 2 mg, Oral, Daily, Shivam Cummings II, MD  •  magnesium hydroxide (MILK OF MAGNESIA) suspension 2400 mg/10mL 10 mL, 10 mL, Oral, Daily PRN, Shivam Cummings II, MD  •  nicotine (NICODERM CQ) 21 MG/24HR patch 1 patch, 1 patch, Transdermal, Q24H, Shivam Cummings II, MD, 1 patch at 21 08  •  OLANZapine (zyPREXA) tablet 10 mg, 10 mg, Oral, BID, Ying Elam APRN, 10 mg at 21  •  OLANZapine zydis (zyPREXA) disintegrating tablet 10 mg, 10 mg, Oral, Q8H PRN, Shivam Cummings II, MD  •   ondansetron (ZOFRAN) tablet 4 mg, 4 mg, Oral, Q6H PRN, Shivam Cummings II, MD  •  terazosin (HYTRIN) capsule 1 mg, 1 mg, Oral, Nightly, Ying Elam APRN  •  traZODone (DESYREL) tablet 50 mg, 50 mg, Oral, Nightly PRN, Shivam Cummings II, MD, 50 mg at 02/15/21 0119  •  [COMPLETED] venlafaxine XR (EFFEXOR-XR) 24 hr capsule 37.5 mg, 37.5 mg, Oral, Daily With Breakfast, 37.5 mg at 02/14/21 0834 **FOLLOWED BY** venlafaxine XR (EFFEXOR-XR) 24 hr capsule 112.5 mg, 112.5 mg, Oral, Daily With Breakfast, Shivam Cummings II, MD, 112.5 mg at 02/16/21 0827    Diagnoses/Assessment:     Bipolar affective disorder, mixed (CMS/[facility])    Suicidal ideation    Opioid use disorder (CMS/HCC)    Stimulant use disorder    Methamphetamine use (CMS/HCC)    Moderate benzodiazepine use disorder (CMS/HCC)    Rule In/Out Substance induced mood disorder    Rule In / Out Substance-induced psychotic disorder      Treatment Plan:    1) Will continue care for the patient on the behavioral health unit at [facility] to ensure patient safety.  2) Will continue to provide treatment with the unit milieu, activities, therapies and psychopharmacological management.  3) Patient to be placed on or continued on  Q15 minute checks  and Suicide and Aggression precautions.  4) Pertinent medical issues:   --Low Vitamin D: Supplement ordered  5) Will order following labs: none  6) Will make the following medication changes:   --Cont Zyprexa 10 mg BID   --Cont Ativan Taper  --Cont Effexor 112 mg daily  --Start Hytrin 1 mg Nightly for sleep quality   7) Will continue discharge planning as appropriate for patient.  8) Psychotherapy provided for less than 16 minutes.    Treatment plan and medication risks and benefits discussed with: Patient    ALETHA Young  02/16/21  10:33 CST

## 2021-02-16 NOTE — PLAN OF CARE
Goal Outcome Evaluation:     Progress: improving  Outcome Summary: Pt has slept very well this evening without requesting extra medication. Slept approx 7 hours this evening and still in bed.  Problem: Adult Behavioral Health Plan of Care  Goal: Plan of Care Review  Outcome: Ongoing, Progressing  Flowsheets (Taken 2/16/2021 2643)  Progress: improving  Outcome Summary: Pt has slept very well this evening without requesting extra medication. Slept approx 7 hours this evening and still in bed.

## 2021-02-17 PROCEDURE — 99232 SBSQ HOSP IP/OBS MODERATE 35: CPT | Performed by: PSYCHIATRY & NEUROLOGY

## 2021-02-17 RX ORDER — ONDANSETRON 4 MG/1
4 TABLET, ORALLY DISINTEGRATING ORAL EVERY 6 HOURS PRN
Status: DISCONTINUED | OUTPATIENT
Start: 2021-02-17 | End: 2021-02-18

## 2021-02-17 RX ADMIN — TERAZOSIN HYDROCHLORIDE 1 MG: 1 CAPSULE ORAL at 20:24

## 2021-02-17 RX ADMIN — Medication 1 PATCH: at 08:25

## 2021-02-17 RX ADMIN — LORAZEPAM 2 MG: 2 TABLET ORAL at 08:25

## 2021-02-17 RX ADMIN — NALTREXONE HYDROCHLORIDE 50 MG: 50 TABLET, FILM COATED ORAL at 08:25

## 2021-02-17 RX ADMIN — VENLAFAXINE HYDROCHLORIDE 150 MG: 75 CAPSULE, EXTENDED RELEASE ORAL at 08:25

## 2021-02-17 RX ADMIN — OLANZAPINE 10 MG: 10 TABLET, FILM COATED ORAL at 20:24

## 2021-02-17 RX ADMIN — OLANZAPINE 10 MG: 5 TABLET, ORALLY DISINTEGRATING ORAL at 17:56

## 2021-02-17 RX ADMIN — Medication 1000 UNITS: at 08:25

## 2021-02-17 RX ADMIN — OLANZAPINE 10 MG: 10 TABLET, FILM COATED ORAL at 08:25

## 2021-02-17 NOTE — PROGRESS NOTES
2/17/2021    Chief Complaint: suicidal ideation and substance abuse    Subjective:  Patient is a 45 y.o. female that is currently inpatient on the adult U Today she is more awake and engaged. She states that she wants to go to a friend's house in [place] then she will go to rehab on her own.    Pt had a shower and attended groups yesterday and reports she is going to do the same today.  Pt reports that she has cravings for opiates and wants something for that, educated on the effects of Naltrexone and give the time to be effective.   Pt voices understanding, states she slept well last night with no bad dreams.    Pt's posture appears animated and restless.    Pt denies SI/HI/AVH, states medications are tolerated well  Objective     Vital Signs    Temp:  [96.9 °F (36.1 °C)-97 °F (36.1 °C)] 97 °F (36.1 °C)  Heart Rate:  [78-96] 78  Resp:  [16-18] 18  BP: (104-132)/(59-87) 132/79    Physical Exam:   General Appearance: alert, appears stated age and cooperative,  Hygiene:   fair  Gait & Station: Normal  Musculoskeletal: No tremors or abnormal involuntary movements    Mental Status Exam:   Cooperation:  Cooperative  Eye Contact:  Fair  Psychomotor Behavior:  Restless  Mood: Improving  Affect:  normal  Speech:  Normal  Thought Process:  Coherent  Associations: Goal Directed  Thought Content:     Mood congruent   Suicidal:  None   Homicidal:  None   Hallucinations:  None   Delusion:  None  Cognitive Functioning:   Consciousness: awake, alert and oriented  Reliability:  fair  Insight:  Poor  Judgement:  Impaired  Impulse Control:  Fair    Lab Results (last 24 hours)     ** No results found for the last 24 hours. **        Imaging Results (Last 24 Hours)     ** No results found for the last 24 hours. **          Medicine:   Current Facility-Administered Medications:   •  acetaminophen (TYLENOL) tablet 650 mg, 650 mg, Oral, Q4H PRN, Shivam Cummings II, MD, 650 mg at 02/12/21 2141  •  aluminum-magnesium  hydroxide-simethicone (MAALOX MAX) 400-400-40 MG/5ML suspension 15 mL, 15 mL, Oral, Q6H PRN, Shivam Cummings II, MD  •  cholecalciferol (VITAMIN D3) tablet 1,000 Units, 1,000 Units, Oral, Daily, Ying Elam, APRN, 1,000 Units at 21 0825  •  [] cloNIDine (CATAPRES) tablet 0.1 mg, 0.1 mg, Oral, 4x Daily PRN **FOLLOWED BY** [] cloNIDine (CATAPRES) tablet 0.1 mg, 0.1 mg, Oral, 4x Daily PRN **FOLLOWED BY** [] cloNIDine (CATAPRES) tablet 0.1 mg, 0.1 mg, Oral, TID PRN **FOLLOWED BY** [] cloNIDine (CATAPRES) tablet 0.1 mg, 0.1 mg, Oral, BID PRN **FOLLOWED BY** cloNIDine (CATAPRES) tablet 0.1 mg, 0.1 mg, Oral, Once PRN, Shivam Cummings II, MD  •  cyclobenzaprine (FLEXERIL) tablet 10 mg, 10 mg, Oral, TID PRN, Shivam Cummings II, MD  •  dicyclomine (BENTYL) capsule 10 mg, 10 mg, Oral, TID PRN, Shivam Cummings II, MD  •  hydrOXYzine pamoate (VISTARIL) capsule 50 mg, 50 mg, Oral, Q6H PRN, Shivam Cummings II, MD, 50 mg at 21 2141  •  loperamide (IMODIUM) capsule 2 mg, 2 mg, Oral, Q2H PRN, Shivam Cummings II, MD  •  magnesium hydroxide (MILK OF MAGNESIA) suspension 2400 mg/10mL 10 mL, 10 mL, Oral, Daily PRN, Shivam Cummings II, MD  •  [COMPLETED] naltrexone (DEPADE) tablet 25 mg, 25 mg, Oral, Daily, 25 mg at 21 1510 **FOLLOWED BY** naltrexone (DEPADE) tablet 50 mg, 50 mg, Oral, Daily, Shivam Cummings II, MD, 50 mg at 21  •  nicotine (NICODERM CQ) 21 MG/24HR patch 1 patch, 1 patch, Transdermal, Q24H, Shivam Cummings II, MD, 1 patch at 21  •  OLANZapine (zyPREXA) tablet 10 mg, 10 mg, Oral, BID, Ying Elam APRN, 10 mg at 21  •  OLANZapine zydis (zyPREXA) disintegrating tablet 10 mg, 10 mg, Oral, Q8H PRN, Shivam Cummings II, MD, 10 mg at 21  •  ondansetron (ZOFRAN) tablet 4 mg, 4 mg, Oral, Q6H PRN, Shivam Cummings II, MD  •  ondansetron ODT (ZOFRAN-ODT) disintegrating  tablet 4 mg, 4 mg, Oral, Q6H PRN, Shivam Cummings II, MD  •  terazosin (HYTRIN) capsule 1 mg, 1 mg, Oral, Nightly, Ying Elam APRN, 1 mg at 02/16/21 2013  •  traZODone (DESYREL) tablet 50 mg, 50 mg, Oral, Nightly PRN, Shivam Cummings II, MD, 50 mg at 02/15/21 0119  •  [COMPLETED] venlafaxine XR (EFFEXOR-XR) 24 hr capsule 37.5 mg, 37.5 mg, Oral, Daily With Breakfast, 37.5 mg at 02/14/21 0834 **FOLLOWED BY** venlafaxine XR (EFFEXOR-XR) 24 hr capsule 150 mg, 150 mg, Oral, Daily With Breakfast, Shivam Cummings II, MD, 150 mg at 02/17/21 0825    Diagnoses/Assessment:     Bipolar affective disorder, mixed (CMS/HCC)    Suicidal ideation    Opioid use disorder (CMS/HCC)    Stimulant use disorder    Methamphetamine use (CMS/HCC)    Moderate benzodiazepine use disorder (CMS/HCC)    Rule In/Out Substance induced mood disorder    Rule In / Out Substance-induced psychotic disorder      Treatment Plan:    1) Will continue care for the patient on the behavioral health unit at [facility] to ensure patient safety.  2) Will continue to provide treatment with the unit milieu, activities, therapies and psychopharmacological management.  3) Patient to be placed on or continued on  Q15 minute checks  and Suicide precautions.  4) Pertinent medical issues: None  5) Will order following labs: None  6) Will make the following medication changes:   --Cont Ativan taper  --Cont Effexor 150 mg daily  --Cont Zyprexa 10 mg nightly   --Cont Hytrin 1 mg QHS  --Start Naltrexone 50 mg daily due to cravings   7) Will continue discharge planning as appropriate for patient.  8) Psychotherapy provided for less than 16 minutes.    Treatment plan and medication risks and benefits discussed with: Patient    ALETHA Young  02/17/21  11:03 CST
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
## 2021-02-17 NOTE — NURSING NOTE
"Pt up to nurses station requesting some medicine,\"I'm very angry and don't know why and I'm about to go off.I need something.\" PRN Zydus given at this time.  "

## 2021-02-17 NOTE — PLAN OF CARE
Goal Outcome Evaluation:  Plan of Care Reviewed With: patient  Progress: no change  Outcome Summary: Patient has slept approximately 6 hours thus far during shift and is currently still sleeping. Pt denies SI, HI and AVH but reports anxiety.

## 2021-02-17 NOTE — NURSING NOTE
Behavior   Note any precipitants to event or behavior   Describe level and action of any aggressive behavior or speech and associated interventions.     Anxiety: Excess Worry and Restless/Edgy  Depression: depressed mood and difficulty concentrating  Pain  0  AVH   no  S/I   no  Plan  no  H/I   no  Plan  no    Affect   flat      Note: Patient at nurses station requesting a drink. Patient given Sprite. Patient denies SI, HI and AVH at this time. Patient is mumbling and appears very drowsy.      Intervention    PRN medication utilized:  no    Instructed in medication usage and effects  Medications administered as ordered  Encouraged to verbalize needs      Response    Verbalized understanding   Did patient take medications as ordered yes   Did patient interact with assessment?  yes     Plan    Will monitor for safety  Will monitor every 15 minutes as ordered  Will evaluate and promote the plan of care    Last BM:  (Please chart in I/O as well)

## 2021-02-17 NOTE — PLAN OF CARE
Goal Outcome Evaluation:  Plan of Care Reviewed With: patient  Progress: improving  Outcome Summary: Pt denies SI/HI/AVH. No behaviors noted. Has showered and been out of room this shift.

## 2021-02-17 NOTE — NURSING NOTE
Behavior   Note any precipitants to event or behavior   Describe level and action of any aggressive behavior or speech and associated interventions.     Anxiety: Patient denies at this time  Depression: Patient denies at this time  Pain  0  AVH   no  S/I   no  Plan  no  H/I   no  Plan  no    Affect   flat      Note: Pt in room during interview. Pt alert denies SI/HI/AVH. Took medication as ordered.       Intervention    PRN medication utilized:  no    Instructed in medication usage and effects  Medications administered as ordered  Encouraged to verbalize needs      Response    Verbalized understanding   Did patient take medications as ordered yes   Did patient interact with assessment?  yes     Plan    Will monitor for safety  Will monitor every 15 minutes as ordered  Will evaluate and promote the plan of care    Last BM:  February 17, 2021  (Please chart in I/O as well)

## 2021-02-18 PROBLEM — B19.20 HEPATITIS C: Status: ACTIVE | Noted: 2021-02-18

## 2021-02-18 PROCEDURE — 99232 SBSQ HOSP IP/OBS MODERATE 35: CPT | Performed by: PSYCHIATRY & NEUROLOGY

## 2021-02-18 RX ADMIN — OLANZAPINE 10 MG: 10 TABLET, FILM COATED ORAL at 08:06

## 2021-02-18 RX ADMIN — Medication 1000 UNITS: at 08:06

## 2021-02-18 RX ADMIN — Medication 1 PATCH: at 08:05

## 2021-02-18 RX ADMIN — VENLAFAXINE HYDROCHLORIDE 150 MG: 75 CAPSULE, EXTENDED RELEASE ORAL at 08:06

## 2021-02-18 RX ADMIN — TERAZOSIN HYDROCHLORIDE 1 MG: 1 CAPSULE ORAL at 20:12

## 2021-02-18 RX ADMIN — OLANZAPINE 10 MG: 5 TABLET, ORALLY DISINTEGRATING ORAL at 11:50

## 2021-02-18 RX ADMIN — NALTREXONE HYDROCHLORIDE 50 MG: 50 TABLET, FILM COATED ORAL at 08:06

## 2021-02-18 RX ADMIN — OLANZAPINE 10 MG: 10 TABLET, FILM COATED ORAL at 20:12

## 2021-02-18 NOTE — CONSULTS
Adult Nutrition  Assessment    Patient Name:  Kimberly Henderson  YOB: 1975  MRN: [id number]  Admit Date:  2/12/2021    Assessment Date:  2/18/2021    Comments:  Pt was admitted with mood problem and suicidal thoughts. Excellent intake of meals. Pt didn't attend nutrition class. She said she is not getting what she wants to eat. Will honor food pref for supper and breakfast. No weight loss, eating difficulty, food allergies noted. No nutrition issues noted.  RDN staff will monitor as needed.    Reason for Assessment     Row Name 02/18/21 1450          Reason for Assessment    Reason For Assessment  identified at risk by screening criteria     Diagnosis  psychosocial     Identified At Risk by Screening Criteria  no indicators present         Nutrition/Diet History     Row Name 02/18/21 1457          Nutrition/Diet History    Typical Food/Fluid Intake  pt has a good appetite and intake she said.     Food Preferences  likes chicken fingers, taters tots, cheeseburgers     Functional Status  able to prepare meals;able to purchase food;ambulatory           Labs/Tests/Procedures/Meds     Row Name 02/18/21 1450          Labs/Procedures/Meds    Lab Results Reviewed  reviewed, pertinent        Medications    Pertinent Medications Reviewed  reviewed, pertinent           Estimated/Assessed Needs     Row Name 02/18/21 145          Calculation Measurements    Weight Used For Calculations  65.8 kg (145 lb 1 oz)        Estimated/Assessed Needs    Additional Documentation  Calorie Requirements (Group);Fluid Requirements (Group);Protein Requirements (Group);Chatsworth-St. Jeor Equation (Group)        Calorie Requirements    Weight Used For Calorie Calculations  65.8 kg (145 lb 1 oz)     Estimated Calorie Need Method  Chatsworth-St Jeor        Chatsworth-St. Jeor Equation    RMR (Chatsworth-St. Jeor Equation)  1293.875     Chatsworth-St. Jeor Activity Factors  1.2     Activity Factors (Chatsworth-St. Jeor)  1552.65        Protein  Requirements    Weight Used For Protein Calculations  65.8 kg (145 lb 1 oz)     Est Protein Requirement Amount (gms/kg)  0.8 gm protein     Estimated Protein Requirements (gms/day)  52.64        Fluid Requirements    Fluid Requirements (mL/day)  1600     RDA Method (mL)  1600         Nutrition Prescription Ordered     Row Name 02/18/21 1500          Nutrition Prescription PO    Current PO Diet  Regular         Evaluation of Received Nutrient/Fluid Intake     Row Name 02/18/21 1501          PO Evaluation    Number of Meals  34     % PO Intake  100               Electronically signed by:  Petra Gonsalves RD  02/18/21 15:03 CST

## 2021-02-18 NOTE — NURSING NOTE
Patient c/o anxiety and agitation. Patient yelling and interrupting staff. Patient upset and cursing at staff. PRN zydis administered.

## 2021-02-18 NOTE — PLAN OF CARE
Goal Outcome Evaluation:  Plan of Care Reviewed With: patient  Progress: improving  Outcome Summary: Patient has slept approximately 6.5 hours thus far during shift and is currently still sleeping. Patient denies SI, HI and AVH at this time.

## 2021-02-18 NOTE — NURSING NOTE
Behavior   Note any precipitants to event or behavior   Describe level and action of any aggressive behavior or speech and associated interventions.     Anxiety: Patient denies at this time  Depression: Patient denies at this time  Pain  0  AVH   no  S/I   no  Plan  no  H/I   no  Plan  no    Affect   flat      Note:Pt is alert, oriented x3 verbal and ambulatory. Appropriate interaction with staff and peers. Took medication as scheduled and ordered. Denies any needs at this time. Will continue to monitor and provide a safe environment.       Intervention    PRN medication utilized:  no    Instructed in medication usage and effects  Medications administered as ordered  Encouraged to verbalize needs      Response    Verbalized understanding   Did patient take medications as ordered yes   Did patient interact with assessment?  yes     Plan    Will monitor for safety  Will monitor every 15 minutes as ordered  Will evaluate and promote the plan of care    Last BM:  unknown date  (Please chart in I/O as well)

## 2021-02-18 NOTE — PROGRESS NOTES
Psychiatry Progress Note   2/18/2021      HD: #6  Legal: Vol    Chief Complaint: Suicidal Ideation and Substance Abuse      Subjective --  Ms. Kimberly Henderson is a 45 y.o. female who was seen on the Adult unit.      Patient is reasonably pleasant with me.  She becomes irritable at times when there is boundary setting.  She is hyper fixated today on need for Ativan as a continued medication.  I discussed that this would not be appropriate given its long-term effects and is potential substance use, confounded with her history.  She became very irritable initially yelled and then walked away.  Later she was apologetic.    Hepatitis C status also been reviewed with patient with need for outpatient follow-up.  Effective treatments that are available, but generally necessitates sobriety for at least 6 months.    She denies any adverse effects to her medicines.    She states she is contemplative regarding substance rehabilitation programs.  She is considering residential but wishes to go stay with friends prior to applying to any of these rehabilitation programs.        Review of Systems:  --Neuro: Denies headache  --GI: Denies stomachache  --MS: Denies muscle ache  --General: Denies dizziness.      Objective   Objective --    Vital Signs:  Temp:  [96.7 °F (35.9 °C)-97.5 °F (36.4 °C)] 97.5 °F (36.4 °C)  Heart Rate:  [83-91] 83  Resp:  [18] 18  BP: (125-140)/(79-85) 140/85    Patient Vitals for the past 24 hrs:   BP Temp Temp src Pulse Resp SpO2   02/18/21 0716 140/85 97.5 °F (36.4 °C) Tympanic 83 18 98 %   02/17/21 1900 126/79 96.7 °F (35.9 °C) Tympanic 89 18 95 %   02/17/21 1727 125/79 -- -- 91 18 96 %          Physical Exam:   -General Appearance:  alert and in NAD  -Hygiene:  Adequate   -Gait & Station:  Blank multiple: Normal  -Musculoskeletal:  No tremors or abnormal involuntary movements and No Cog [place] or Rigidity and No atrophy noted  -Pulm: unlaboured    Mental Status Exam:   --Cooperation:   "Cooperative  --Eye Contact:  Fair and Sustained  --Psychomotor Behavior:  Appropriate  --Mood:  \"Fine\"  --Affect:  Generally coherent, though some irritability as noted above in the setting of boundary settings  --Speech:  Normal r/r/v  --Thought Process:  Coherent, Edison, No Flight of Ideas and Connected to affect  --Associations: Goal Directed and No Looseness of Associations  --Themes:  Self Improvement and hyper fixation on medication  --Thought Content:     --Mood congruent   --Suicidal:  Denies    --Homicidal:  Denies   --Hallucinations:  Denies and Not appearing to respond to internal stimuli at time of my interview   --Delusion:  None noted/overt and No overt psychotic overlay  --Cognitive Functioning:  --Consciousness: awake and alert  Attention:  Adequate and Improving  --Reliability:  fair  --Insight:  Improving  --Judgment:  Improving  --Impulse Control:  Improving      Lab Results (last 24 hours)     ** No results found for the last 24 hours. **        Results source: EMR    Imaging Results (Last 24 Hours)     ** No results found for the last 24 hours. **        Results source: EMR    Medications:   Scheduled Meds:cholecalciferol, 1,000 Units, Oral, Daily  naltrexone, 50 mg, Oral, Daily  nicotine, 1 patch, Transdermal, Q24H  OLANZapine, 10 mg, Oral, BID  terazosin, 1 mg, Oral, Nightly  venlafaxine XR, 150 mg, Oral, Daily With Breakfast      Continuous Infusions:   PRN Meds:.•  acetaminophen  •  aluminum-magnesium hydroxide-simethicone  •  cyclobenzaprine  •  dicyclomine  •  hydrOXYzine pamoate  •  loperamide  •  magnesium hydroxide  •  OLANZapine zydis  •  ondansetron  •  ondansetron ODT  •  traZODone      Assessment:     Bipolar affective disorder, mixed (CMS/HCC)    Suicidal ideation    Opioid use disorder (CMS/HCC)    Stimulant use disorder    Methamphetamine use (CMS/HCC)    Moderate benzodiazepine use disorder (CMS/HCC)    Rule In/Out Substance induced mood disorder    Rule In / Out " Substance-induced psychotic disorder    Hepatitis C        --> IMPRESSION: 45-year-old female who has had resolution of her suicidal ideation and significant provement in her mood symptomatology.  These occur in the context of significant substance use.  Patient is contemplative regarding further change but not at an action point and is requesting to return home with friends.      Treatment Plan:  1) Will continue care for the patient on the behavioral health unit at [facility].    2) Will continue to provide treatment with the unit milieu, activities, therapies and psychopharmacological management.    3) Patient to be placed on or continued on  Q15 minute checks  and Suicide precautions.    4) Treatment Planning:    Bipolar disorder: slowly improving  --Cont Zyprexa 10mg BID  --Cont Zydis PRN  --Cont Effexor  mg daily for depressive sx & SI  --Cont Hytrin 1mg qHS for nightmares     Substance use d/o  --Has finished ativan taper  --Cont nicotine patch  --Finishing [facility] today for opiate detox  --Cont to encourage sub use rehab  --Cont Naltrexone 50mg daily for opiate use d/o    Hepatitis C:  --Plan for outpatient PCP or GI follow up    Hypovitaminosis D  --Cont D3 @ 1K daily      --> Psychotherapy provided: <15 min    --> Dispostion Planning: to return to community, tomorrow.    Treatment plan and medication risks and benefits discussed with: Patient and Treatment Team.    Shivam Cummings II, MD  02/18/21 @ 14:28 CST  Dictated using Dragon.

## 2021-02-18 NOTE — NURSING NOTE
Behavior   Note any precipitants to event or behavior   Describe level and action of any aggressive behavior or speech and associated interventions.     Anxiety: Patient denies at this time  Depression: Patient denies at this time  Pain  0  AVH   no  S/I   no  Plan  no  H/I   no  Plan  no    Affect   flat      Note: Patient sleeping when signee entered room. Patient denies SI, HI and AVH at this time Patient is cooperative and compliant with medications. Will continue to monitor and provide a safe environment.      Intervention    PRN medication utilized:  no    Instructed in medication usage and effects  Medications administered as ordered  Encouraged to verbalize needs      Response    Verbalized understanding   Did patient take medications as ordered yes   Did patient interact with assessment?  yes     Plan    Will monitor for safety  Will monitor every 15 minutes as ordered  Will evaluate and promote the plan of care    Last BM:    (Please chart in I/O as well)

## 2021-02-18 NOTE — PLAN OF CARE
Goal Outcome Evaluation:  Plan of Care Reviewed With: patient  Progress: no change  Outcome Summary: Pt became anxious today when she could not find a ride home. Zyprexa Zydis given to pt to help her calm down.

## 2021-02-19 VITALS
RESPIRATION RATE: 18 BRPM | WEIGHT: 145 LBS | HEIGHT: 65 IN | TEMPERATURE: 97.9 F | HEART RATE: 73 BPM | OXYGEN SATURATION: 96 % | DIASTOLIC BLOOD PRESSURE: 88 MMHG | SYSTOLIC BLOOD PRESSURE: 141 MMHG | BODY MASS INDEX: 24.16 KG/M2

## 2021-02-19 PROBLEM — R45.851 SUICIDAL IDEATION: Status: RESOLVED | Noted: 2021-02-12 | Resolved: 2021-02-19

## 2021-02-19 PROCEDURE — 99238 HOSP IP/OBS DSCHRG MGMT 30/<: CPT | Performed by: PSYCHIATRY & NEUROLOGY

## 2021-02-19 RX ORDER — VENLAFAXINE HYDROCHLORIDE 150 MG/1
150 CAPSULE, EXTENDED RELEASE ORAL DAILY
Qty: 30 CAPSULE | Refills: 0 | Status: SHIPPED | OUTPATIENT
Start: 2021-02-19

## 2021-02-19 RX ORDER — OLANZAPINE 10 MG/1
10 TABLET ORAL 2 TIMES DAILY
Qty: 60 TABLET | Refills: 0 | Status: SHIPPED | OUTPATIENT
Start: 2021-02-19

## 2021-02-19 RX ORDER — NALTREXONE HYDROCHLORIDE 50 MG/1
50 TABLET, FILM COATED ORAL DAILY
Qty: 30 TABLET | Refills: 0 | Status: SHIPPED | OUTPATIENT
Start: 2021-02-19

## 2021-02-19 RX ORDER — TERAZOSIN 1 MG/1
1 CAPSULE ORAL 2 TIMES DAILY
Qty: 60 CAPSULE | Refills: 0 | Status: SHIPPED | OUTPATIENT
Start: 2021-02-19

## 2021-02-19 RX ORDER — MELATONIN
2000 DAILY
Qty: 60 TABLET | Refills: 0 | Status: SHIPPED | OUTPATIENT
Start: 2021-02-19

## 2021-02-19 RX ADMIN — NALTREXONE HYDROCHLORIDE 50 MG: 50 TABLET, FILM COATED ORAL at 07:54

## 2021-02-19 RX ADMIN — Medication 1 PATCH: at 07:53

## 2021-02-19 RX ADMIN — OLANZAPINE 10 MG: 10 TABLET, FILM COATED ORAL at 07:55

## 2021-02-19 RX ADMIN — Medication 1000 UNITS: at 07:54

## 2021-02-19 RX ADMIN — VENLAFAXINE HYDROCHLORIDE 150 MG: 75 CAPSULE, EXTENDED RELEASE ORAL at 07:55

## 2021-02-19 NOTE — DISCHARGE INSTRUCTIONS
--Continue medications as currently prescribed.  --Continue follow-up with outpatient providers.  --Please contact our Behavioral Health Unit with any questions or concerns at [phone].  --Return to the ER with any suicidal or homicidal ideation, or worsening symptoms.    --The number for the National Suicide & Crisis Hotline is 1-651.633.4917.  The National Crisis Text number is 770782.  These may be contacted at any time, if needed.

## 2021-02-19 NOTE — PLAN OF CARE
Goal Outcome Evaluation:  Plan of Care Reviewed With: patient  Progress: improving  Outcome Summary: Patient has slept approx 7 hours at this time and is still asleep. No needs voiced overnight.

## 2021-02-19 NOTE — NURSING NOTE
"Behavior   Note any precipitants to event or behavior   Describe level and action of any aggressive behavior or speech and associated interventions.     Anxiety: Restless/Edgy  Depression: Patient denies at this time  Pain  0  AVH   no  S/I   no  Plan  no  H/I   no  Plan  no    Affect   blunted      Note: Patient resting in bed at time of assessment. Denies SI/HI/AVH. Reports that she is having some anxiety tonight, will not elaborate just states 'Its just everything going on lately\". Patient reports that she is looking forward to discharge possible tomorrow. Took scheduled meds as ordered. Got up and participated in group and ate snack. No needs voiced at this time. Will continue to monitor and provide a safe environment.       Intervention    PRN medication utilized:  no    Instructed in medication usage and effects  Medications administered as ordered  Encouraged to verbalize needs      Response    Verbalized understanding   Did patient take medications as ordered yes   Did patient interact with assessment?  yes     Plan    Will monitor for safety  Will monitor every 15 minutes as ordered  Will evaluate and promote the plan of care    Last BM:  unknown date  (Please chart in I/O as well)  "

## 2021-02-19 NOTE — NURSING NOTE
Patient is alert and oriented x 4. Patient is ambulatory at discharge. Personal belongings returned from [facility] and security. Medications and follow up appointments reviewed with patient prior to discharge. AVS and safety plan provided. [facility] scheduled to  for transport.
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
## 2021-02-19 NOTE — SIGNIFICANT NOTE
Undersigned met with Kimberly, 1:1 this date to discuss discharge/safety planning. Kimberly appears at baseline - unkempt and bizzare. However, SI has been resolved. Kimberly will follow up with Dr. Boudreaux and [facility]. She will be contacted with appointments when both facilities are reachable. Referral for gastro services was also made at Dr. Cummings's request. [employer] transportation was arranged for 12:30 PM. Kimberly was provided with information regarding inpatient substance use facilities to follow up with post d/c as she declined any referrals being made during admission. Kimberly will d/c to a friend's home this date. No safety concerns were noted.